# Patient Record
Sex: FEMALE | Race: WHITE | Employment: STUDENT | URBAN - METROPOLITAN AREA
[De-identification: names, ages, dates, MRNs, and addresses within clinical notes are randomized per-mention and may not be internally consistent; named-entity substitution may affect disease eponyms.]

---

## 2018-03-08 ENCOUNTER — HOSPITAL ENCOUNTER (OUTPATIENT)
Dept: PHYSICAL THERAPY | Age: 28
Discharge: HOME | End: 2018-03-08
Payer: COMMERCIAL

## 2018-03-08 PROCEDURE — 97140 MANUAL THERAPY 1/> REGIONS: CPT

## 2018-03-08 PROCEDURE — 97110 THERAPEUTIC EXERCISES: CPT

## 2018-03-08 PROCEDURE — 97162 PT EVAL MOD COMPLEX 30 MIN: CPT

## 2018-03-08 NOTE — PROGRESS NOTES
PHYSICAL THERAPY - DAILY TREATMENT NOTE    Patient Name: Raiza Lees        Date: 3/8/2018  : 1990   yes Patient  Verified  Visit #:     Insurance: Payor: Chencho Busby / Plan: [employer] HMO / Product Type: HMO /      In time: 11:27 Out time: 12:08   Total Treatment Time: 41     Medicare Time Tracking (below)   Total Timed Codes (min):  N/A 1:1 Treatment Time:  N/A     TREATMENT AREA =  Low back pain [M54.5]    SUBJECTIVE  Pain Level (on 0 to 10 scale):  4  / 10   Medication Changes/New allergies or changes in medical history, any new surgeries or procedures?    no  If yes, update Summary List   Subjective Functional Status/Changes:  []  No changes reported     See initial eval          OBJECTIVE      10 min Therapeutic Exercise:  [x]  See flow sheet   Rationale:      increase strength to improve the patients ability to complete tranfers, amb, stair negotiation     12 min Manual Therapy: MET to correct L post rotated innominate in supine. STM to L glute med, piriformis, lumbosacral junction and l/s paraspinals in prone with pillow under hips. Rationale:      decrease pain, decrease trigger points and improve alignment to improve patient's ability to complete transfers, amb, stair negotiation    N/A min Patient Education:  yes  Reviewed HEP   []  Progressed/Changed HEP based on:  Pt instructed on and given HEP to be completed daily. Pt educated on red flags and to seek immediate medical attention/911 if those occur. Pt instructed to avoid heavy lifting, plyometrics/running and Crossfit at this time. Pt educated on proper body mechanics with lifting and transfers. Pt instructed to avoid activities/positions that cause peripheralization of pain/sxs. Reviewed POC and goals. Pt reports understanding.      Other Objective/Functional Measures:    See initial eval     Post Treatment Pain Level (on 0 to 10) scale:    10     ASSESSMENT  Assessment/Changes in Function:     See initial eval     []  See Progress Note/Recertification   Patient will continue to benefit from skilled PT services to see initial eval.   Progress toward goals / Updated goals:    Pt denied sharp pains or red flags with initial eval or therapeutic ex. See initial eval.     PLAN  [x]  Upgrade activities as tolerated yes Continue plan of care   []  Discharge due to :    [x]  Other: PT 2x/week for 4-6 weeks.      Therapist: Herve Smart PT    Date: 3/8/2018 Time: 12:08 PM     Future Appointments  Date Time Provider Magdalena Lombardi   3/12/2018 8:30 AM Miquel Daniels, PTA [facility]   3/14/2018 1:30 PM Miquel Daniels, PTA [facility]   3/16/2018 3:00 PM Anahy Hall, ASH [facility]   3/20/2018 9:00 AM Miquel Daniels, PTA [facility]   3/22/2018 9:00 AM Herve Smart, PT [facility]   3/26/2018 11:30 AM Herve Smart, PT [facility]   3/28/2018 9:30 AM Herve Smart, PT [facility]   4/2/2018 11:30 AM Herve Smart, PT [facility]   4/9/2018 10:30 AM Herve Smart, PT [facility]   4/13/2018 1:30 PM Anahy Hall, PTA [facility]

## 2018-03-08 NOTE — PROGRESS NOTES
[address] [place] [address], [facility] - Phone: [phone]  Fax: [phone] / [facility]  Patient Name: Mariella Vazquez : 1990   Medical   Diagnosis: Radiculopathy, lumbar region Treatment Diagnosis: Low back pain [M54.5], L>R LE pain   Onset Date: 6 weeks prior to PT eval     Referral Source: Pranav Pearson MD Start of Care [facility]): 3/8/2018   Prior Hospitalization: See medical history Provider #: [id number]   Prior Level of Function: Complete prolonged sitting, lifting, transfers, stair negotiation and movement without L/s or L>R LE pain or difficulty   Comorbidities: Depression, Bipolar, Migraines, PMHx of intermittent LBP   Medications: Verified on Patient Summary List   The Plan of Care and following information is based on the information from the initial evaluation.   ===========================================================================================  Assessment / key information:  Pt is a 29 y/o female reporting l/s pain and L>R LE pain rated 3-10/10 that started approximately 6 weeks ago while completing wall balls with 8 lb ball at [place] [address]. Pt reports while going into a squat she felt a pop in her back and immediate l/s and L>R LE pain. Pt reports trying to treat with OTC meds and rest on own, but symptoms did not improve. Pt saw MD 2x since injury and was given meds with temporary improvement in pain/sxs. Pt reports moving a week ago and experiencing increased pain with lifting/carrying/moving objects. Pt reports noticing weakness in L LE. Pain radiates from l/s to toes L>R. Pt intermittently experiences B groin pain. Pt reports intermittent, random numbness/tingling in 3rd through 5th toes and L lat foot.  Pain/sxs increase with movement, sitting, massage, stair negotiation, transfers, rolling over in bed and lifting/carrying objects; decreases with rest, lying supine with LE elevated, side-lying with pillow between knees and meds. Pt reports no imaging performed, but she is scheduled for EMG on 3/27/18. 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 Pain/sxs in past have improved with meds, rest and PT. Pt denies recent falls, bowel/bladder issues, or red flags. Pt demonstrates poor body mechanics with transfers, poor mechanics with mini-squat, decreased pantera with amb, decreased l/s AROM (Standing: ext decreased 90%, flex decreased 75%, B Rot and lat flex decreased 50%; pain with SHANTANU, DKTC, SKTC), decreased L hip PROM (80 degrees flexion, pain with IR/ER), unable to complete scour L hip due to pain, (-) R scour, B Slump and R SLR, (+) B Trendelenberg and L SLR, B LE sensation intact and symmetrical to light touch, L post rotated innominate, TTP L>R glute med/max, piriformis, QL, lumbosacral junction, l/s paraspinals and L proximal HS, decreased B hip/glute strength (hip ABD 3/5 L, 3+/5 R, 3/5 B hip ADD, 3/5 B hip ext, 3+/5 B hip flex strength), 4-/5 L quad, 4/5 R quad, 4-/5 B HS, 4/5 B ankle DF/PF, and decreased functional mobility. Pt reported L LE radicular pain to knee, denied R LE radicular pain during initial eval. Unable to centralize or abolish pain/sxs with repeated movement testing in initial eval. FOTO Score: 44/100  ===========================================================================================  Eval Complexity: History MEDIUM  Complexity : 1-2 comorbidities / personal factors will impact the outcome/ POC ;  Examination  HIGH Complexity : 4+ Standardized tests and measures addressing body structure, function, activity limitation and / or participation in recreation ; Presentation MEDIUM Complexity : Evolving with changing characteristics ;   Decision Making MEDIUM Complexity : FOTO score of 26-74; Overall Complexity MEDIUM  Problem List: pain affecting function, decrease ROM, decrease strength, impaired gait/ balance, decrease ADL/ functional abilitiies, decrease activity tolerance, decrease flexibility/ joint mobility and decrease transfer abilities   Treatment Plan may include any combination of the following: Therapeutic exercise, Therapeutic activities, Neuromuscular re-education, Physical agent/modality, Gait/balance training, Manual therapy, Aquatic therapy, Patient education, Self Care training, Functional mobility training, Home safety training and Stair training  Patient / Family readiness to learn indicated by: asking questions, trying to perform skills and interest  Persons(s) to be included in education: patient (P)  Barriers to Learning/Limitations: None  Measures taken:    Patient Goal (s): \"Less pain, more everyday function, normal strength in leg\"   Patient self reported health status: excellent  Rehabilitation Potential: good   Short Term Goals: To be accomplished in  2  weeks:  1. Pt to demonstrate independence with HEP to improve l/s AROM, glute/core/hip strength/stability for transfers and stair negotiation. 2. Pt to demonstrate correct body mechanics with supine to sit and sit to stand transfers to protect l/s and improves safety and efficiency of transfers at home.  Long Term Goals: To be accomplished in  4-6  weeks:  1. Pt to report 63/100 or > on FOTO scores to improve functional mobility for stair negotiation and lifting/carring objects. 2. Pt to report 50% or > decrease in max pain levels to improve functional mobility for lifting/carrying objects. 3. Pt to demonstrate l/s AROM WFL to improve mobility for ADLs and transfers.   Frequency / Duration:   Patient to be seen  2  times per week for 4-6  weeks:  Patient / Caregiver education and instruction: self care, activity modification and exercises  G-Codes (GP): N/A  Therapist Signature: Leilani Lowery PT Date: 8/8/5398   Certification Period: N/A Time: 12:08 PM ===========================================================================================  I certify that the above Physical Therapy Services are being furnished while the patient is under my care. I agree with the treatment plan and certify that this therapy is necessary. Physician Signature:        Date:       Time:     Please sign and return to [facility]. or you may fax the signed copy to [phone]. Thank you.

## 2018-03-14 ENCOUNTER — HOSPITAL ENCOUNTER (OUTPATIENT)
Dept: PHYSICAL THERAPY | Age: 28
Discharge: HOME | End: 2018-03-14
Payer: COMMERCIAL

## 2018-03-14 PROCEDURE — 97110 THERAPEUTIC EXERCISES: CPT

## 2018-03-14 PROCEDURE — 97140 MANUAL THERAPY 1/> REGIONS: CPT

## 2018-03-14 NOTE — PROGRESS NOTES
PHYSICAL THERAPY - DAILY TREATMENT NOTE    Patient Name: Rea Willson        Date: 3/14/2018  : 1990   YES Patient  Verified  Visit #:     Insurance: Payor: Rick Postal / Plan: [address] [place] [address] [place] HMO / Product Type: HMO /      In time: 125 Out time: 210   Total Treatment Time: 45     Medicare Time Tracking (below)   Total Timed Codes (min):   1:1 Treatment Time:       TREATMENT AREA =  Low back pain [M54.5]    SUBJECTIVE  Pain Level (on 0 to 10 scale):   10   Medication Changes/New allergies or changes in medical history, any new surgeries or procedures? NO    If yes, update Summary List   Subjective Functional Status/Changes:  []  No changes reported     Been doing the exercises. Exercises don't seem to help/          OBJECTIVE    35 min Therapeutic Exercise:  [x]  See flow sheet   Rationale:      increase ROM and increase strength to improve the patients ability to perform functional mobility activities with decrease c/o symptoms     10 min Manual Therapy: DTM L/S paraspinals, L > R, DTM (B) upper glute L>R and L piriformis release. Rationale:      decrease pain, increase ROM and increase tissue extensibility to improve patient's ability to perform functional mobility activities with decrease c/o symptoms       min Patient Education:  YES  Reviewed HEP   []  Progressed/Changed HEP based on: Other Objective/Functional Measures:    No change in functional measurements today. Performed all position with prone including side lying with right bend, no better no worse in symptoms. Patient received HEP for prone lying and prone on elbows. Patient to report back on effectiveness.    Post Treatment Pain Level (on 0 to 10) scale:   3  / 10     ASSESSMENT  Assessment/Changes in Function:     Overall good tolerance to all therapeutic interventions for first treatment session     []  See Progress Note/Recertification   Patient will continue to benefit from skilled PT services to modify and progress therapeutic interventions, address functional mobility deficits, address ROM deficits, address strength deficits, analyze and address soft tissue restrictions and analyze and cue movement patterns to attain remaining goals. Progress toward goals / Updated goals:    No change toward goals today.      PLAN  []  Upgrade activities as tolerated YES Continue plan of care   []  Discharge due to :    []  Other:      Therapist: TEE Mccormick    Date: 3/14/2018 Time: 7:04 AM       Future Appointments  Date Time Provider Magdalena Lombardi   3/14/2018 1:30 PM Sam Bro PTA [facility]   3/16/2018 3:00 PM Amy Michael, ASH [facility]   3/20/2018 9:00 AM Sam Bro PTA [facility]   3/22/2018 9:00 AM Glorious Lennox, PT [facility]   3/26/2018 11:30 AM Glorious Lennox, PT [facility]   3/28/2018 9:30 AM Glorious Lennox, PT [facility]   4/2/2018 11:30 AM Glorious Lennox, PT [facility]   4/9/2018 10:30 AM Glorious Lennox, PT [facility]   4/13/2018 1:30 PM Amy Michael, [facility]

## 2018-03-16 ENCOUNTER — HOSPITAL ENCOUNTER (OUTPATIENT)
Dept: PHYSICAL THERAPY | Age: 28
Discharge: HOME | End: 2018-03-16
Payer: COMMERCIAL

## 2018-03-16 PROCEDURE — 97140 MANUAL THERAPY 1/> REGIONS: CPT

## 2018-03-16 PROCEDURE — 97110 THERAPEUTIC EXERCISES: CPT

## 2018-03-16 NOTE — PROGRESS NOTES
PHYSICAL THERAPY - DAILY TREATMENT NOTE    Patient Name: Buck Marrufo        Date: 3/16/2018  : 1990   YES Patient  Verified  Visit #:   3   of   12  Insurance: Payor: Denia Farrell / Plan: [employer]let HMO / Product Type: HMO /      In time: 255 Out time: 340   Total Treatment Time: 45     Medicare Time Tracking (below)   Total Timed Codes (min):  35 1:1 Treatment Time:       TREATMENT AREA =  Low back pain [M54.5]    SUBJECTIVE  Pain Level (on 0 to 10 scale):  3  / 10   Medication Changes/New allergies or changes in medical history, any new surgeries or procedures? NO    If yes, update Summary List   Subjective Functional Status/Changes:  []  No changes reported     Pt reports no change in pain, still having back pain. Reports no leg pain today. (L) sided low back and upper hip pain. Reports compliance with HEP. OBJECTIVE  Modalities Rationale:     decrease pain and increase tissue extensibility to improve patient's ability to perform pain free ADLs. min [] Estim, type/location:                                      []  att     []  unatt     []  w/US     []  w/ice    []  w/heat    min []  Mechanical Traction: type/lbs                   []  pro   []  sup   []  int   []  cont    []  before manual    []  after manual    min []  Ultrasound, settings/location:      min []  Iontophoresis w/ dexamethasone, location:                                               []  take home patch       []  in clinic   10 min []  Ice     [x]  Heat    location/position: Prone, lumbar/(B) hips    min []  Vasopneumatic Device, press/temp:     min []  Other:    [x] Skin assessment post-treatment (if applicable):    [x]  intact    []  redness- no adverse reaction     []redness - adverse reaction:        25 min Therapeutic Exercise:  [x]  See flow sheet   Rationale:      increase ROM, increase strength, improve coordination and improve balance to improve the patients ability to perform pain free ADLs.       [address] Therapy: STM/DTM (L) glute/piriformis. Rationale:      decrease pain, increase ROM, increase tissue extensibility and decrease trigger points to improve patient's ability to perform pain free ADLs. min Patient Education:  YES  Reviewed HEP   []  Progressed/Changed HEP based on: Other Objective/Functional Measures: Therex per flow sheet. Added multiple exercises to improve core strength and stability for ADLs. See flow sheet. Reduction in radicular symptoms with extension. Progressed to 3x10 PPU. Instructed pt to continue with PPU @ home if radicular pain continued to decrease. Pain located @ upper (L) hip and low back @ end of session. Post Treatment Pain Level (on 0 to 10) scale:   3  / 10     ASSESSMENT  Assessment/Changes in Function:     Good response to extension - reporting decrease in radicular symptosm. []  See Progress Note/Recertification   Patient will continue to benefit from skilled PT services to modify and progress therapeutic interventions, address functional mobility deficits, address ROM deficits, address strength deficits, analyze and address soft tissue restrictions, analyze and cue movement patterns, analyze and modify body mechanics/ergonomics and assess and modify postural abnormalities to attain remaining goals. Progress toward goals / Updated goals:    Progressing toward LTG #3.       PLAN  [x]  Upgrade activities as tolerated YES Continue plan of care   []  Discharge due to :    []  Other:      Therapist: Patricia Cortez PTA    Date: 3/16/2018 Time: 3:08 PM     Future Appointments  Date Time Provider Magdalena Lombardi   3/20/2018 9:00 AM Amanda Elias PTA [facility]   3/22/2018 9:00 AM Addie Mehta, PT [facility]   3/26/2018 11:30 AM Addie Mehta, PT [facility]   3/28/2018 9:30 AM Addie Mehta, PT [facility]   4/2/2018 11:30 AM Addie Mehta, PT [facility]   4/9/2018 10:30 AM Addie Mehta, PT [facility]   4/13/2018 1:30 PM Brett Pavithra Preethi Hippo [facility]

## 2018-03-20 ENCOUNTER — HOSPITAL ENCOUNTER (OUTPATIENT)
Dept: PHYSICAL THERAPY | Age: 28
Discharge: HOME | End: 2018-03-20
Payer: COMMERCIAL

## 2018-03-20 PROCEDURE — 97110 THERAPEUTIC EXERCISES: CPT

## 2018-03-20 PROCEDURE — 97140 MANUAL THERAPY 1/> REGIONS: CPT

## 2018-03-20 NOTE — PROGRESS NOTES
PHYSICAL THERAPY - DAILY TREATMENT NOTE    Patient Name: Emili Crabtree        Date: 3/20/2018  : 1990   YES Patient  Verified  Visit #:     Insurance: Payor: Naomi Saez / Plan: [address] [place] [address] [place] HMO / Product Type: HMO /      In time: 513 Out time: 940   Total Treatment Time: 35     Medicare Time Tracking (below)   Total Timed Codes (min):   1:1 Treatment Time:       TREATMENT AREA =  Low back pain [M54.5]    SUBJECTIVE  Pain Level (on 0 to 10 scale):  3   10   Medication Changes/New allergies or changes in medical history, any new surgeries or procedures? NO    If yes, update Summary List   Subjective Functional Status/Changes:  []  No changes reported     No change in radicular symptoms in (L) LE. Pain referred to knee. No radicular pain in (R) LE.  1-10 in (L) radicular pain/burning sensation. Been doing the HEP along with press ups - no change in symptoms. OBJECTIVE    25 min Therapeutic Exercise:  [x]  See flow sheet   Rationale:      increase ROM and increase strength to improve the patients ability to perform functional mobility activities with decrease c/o symptoms    10 min Manual Therapy: DTM L/S paraspinals, L > R, DTM (B) upper glute L>R and L piriformis release, gentle PA mobs   Rationale:      decrease pain, increase ROM and increase tissue extensibility to improve patient's ability to perform functional mobility activities with decrease c/o symptoms     min Patient Education:  YES  Reviewed HEP   []  Progressed/Changed HEP based on: Other Objective/Functional Measures:    Continues to report some relief of symptoms with prone extension exercise and was instructed to continue with them. Limited carry over at this time. No change in symptoms with PA mobs. = pelvis. Post Treatment Pain Level (on 0 to 10) scale:   3  / 10     ASSESSMENT  Assessment/Changes in Function:     Reports no significant changes in function. Symptoms remain the same.     Patient to see MD on 3/27/18     []  See Progress Note/Recertification   Patient will continue to benefit from skilled PT services to modify and progress therapeutic interventions, address functional mobility deficits, address ROM deficits, address strength deficits, analyze and address soft tissue restrictions and analyze and cue movement patterns to attain remaining goals. Progress toward goals / Updated goals:    No change toward goals noted today. Slow progression with STGs.      PLAN  []  Upgrade activities as tolerated YES Continue plan of care   []  Discharge due to :    []  Other:      Therapist: TEE Mora    Date: 3/20/2018 Time: 6:21 AM       Future Appointments  Date Time Provider Magdalena Lombardi   3/20/2018 9:00 AM Reed Hoover, ASH [facility]   3/22/2018 9:00 AM Shavonne Abdi, PT [facility]   3/26/2018 11:30 AM Shavonne Abdi, PT [facility]   3/28/2018 9:30 AM Shavonne Abdi PT [facility]   4/2/2018 11:30 AM Shavonne Abdi PT [facility]   4/9/2018 10:30 AM Shavonne Abdi, PT [facility]   4/13/2018 1:30 PM Ge Mathis, ASH [facility]

## 2018-03-22 ENCOUNTER — HOSPITAL ENCOUNTER (OUTPATIENT)
Dept: PHYSICAL THERAPY | Age: 28
Discharge: HOME | End: 2018-03-22
Payer: COMMERCIAL

## 2018-03-22 PROCEDURE — 97110 THERAPEUTIC EXERCISES: CPT

## 2018-03-22 PROCEDURE — 97140 MANUAL THERAPY 1/> REGIONS: CPT

## 2018-03-22 NOTE — PROGRESS NOTES
PHYSICAL THERAPY - DAILY TREATMENT NOTE    Patient Name: Jacki Linda        Date: 3/22/2018  : 1990   yes Patient  Verified  Visit #:      of   12  Insurance: Payor: Billy Lunsford / Plan: [address] [place] [address] [employer] / Product Type: HMO /      In time: 8:55 Out time: 9:46   Total Treatment Time: 51     Medicare Time Tracking (below)   Total Timed Codes (min):  N/A 1:1 Treatment Time:  N/A     TREATMENT AREA =  Low back pain [M54.5]    SUBJECTIVE  Pain Level (on 0 to 10 scale):  -7  / 10   Medication Changes/New allergies or changes in medical history, any new surgeries or procedures?    no  If yes, update Summary List   Subjective Functional Status/Changes:  []  No changes reported     Pt reports increased pain the night of last PT session. Pt reports stiffness in l/s and constant radicular pain to knees (B). Pt denies bowel/bladder issues or red flags. Pt reports she will be seeing MD next week. Pt reports compliance with HEP and sometimes repeated l/s ext improves radicular sxs and sometimes no change. 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 OBJECTIVE  Modalities Rationale:     decrease inflammation and decrease pain to improve patient's ability to complete amb, transfers, stair negotiation.    min [] Estim, type/location:                                      []  att     []  unatt     []  w/US     []  w/ice    []  w/heat    min []  Mechanical Traction: type/lbs                   []  pro   []  sup   []  int   []  cont    []  before manual    []  after manual    min []  Ultrasound, settings/location:      min []  Iontophoresis w/ dexamethasone, location:                                               []  take home patch       []  in clinic   10 min [x]  Ice     []  Heat    location/position: Prone to l/s and glutes    min []  Vasopneumatic Device, press/temp:     min []  Other:    [x] Skin assessment post-treatment (if applicable):    [x]  intact   -no adverse reaction     []redness - adverse reaction:        20 min Therapeutic Exercise:  [x]  See flow sheet   Rationale:      increase strength and centralize radicular sxs to improve the patients ability to complete amb, transfers, stair negotiation. 21 min Manual Therapy: MET to correct L ant rotated innominate. STM/DTM to B glute max/med, piriformis and l/s parsapinals in prone. Rationale:      decrease pain, decrease trigger points and improve alignment to improve patient's ability to complete amb, transfers, stair negotiation. N/A min Patient Education:  yes  Reviewed HEP   []  Progressed/Changed HEP based on:  Reviewed importance of body mechanics. Reviewed red flags and to seek immediate medical attention/911 if those occur. Pt reports understanding. Other Objective/Functional Measures:    10 reps SHANTANU (no change in radicular sxs)  10 reps prone press up - 2 sets (no change in radicular sxs)     Post Treatment Pain Level (on 0 to 10) scale:   6-7  / 10     ASSESSMENT  Assessment/Changes in Function:     Pt denied sharp pains or red flags with therapeutic ex. Pt reported feeling decrease in pain after manual therapy, but reported 6-7/10 at end of session. []  See Progress Note/Recertification   Patient will continue to benefit from skilled PT services to modify and progress therapeutic interventions, address functional mobility deficits, address strength deficits, analyze and address soft tissue restrictions and analyze and cue movement patterns to attain remaining goals. Progress toward goals / Updated goals:    No significant progress towards goals. Pt continues to require cues for body mechanics with transfers. Pt unable to centralize/abolish or change radicular sxs with repeated ext or manual therapy during PT session.  Plan to re-assess GROC/FOTO next session and if no change, send pt back to MD. PLAN  [x]  Upgrade activities as tolerated yes Continue plan of care   []  Discharge due to :    []  Other:      Therapist: Ulysses Glenn, PT    Date: 3/22/2018 Time: 9:08 AM     Future Appointments  Date Time Provider Magdalena Lombardi   3/26/2018 11:30 AM Ulysses Glenn, PT [facility]   3/28/2018 9:30 AM Ulysses Glenn, PT [facility]   4/2/2018 11:30 AM Ulysses Glenn, PT [facility]   4/9/2018 10:30 AM Ulysses Glenn, PT [facility]   4/13/2018 1:30 PM Natalie Lopez, ASH [facility]

## 2018-03-26 ENCOUNTER — HOSPITAL ENCOUNTER (OUTPATIENT)
Dept: PHYSICAL THERAPY | Age: 28
Discharge: HOME | End: 2018-03-26
Payer: COMMERCIAL

## 2018-03-26 PROCEDURE — 97140 MANUAL THERAPY 1/> REGIONS: CPT

## 2018-03-26 PROCEDURE — 97110 THERAPEUTIC EXERCISES: CPT

## 2018-03-26 NOTE — PROGRESS NOTES
[facility] PHYSICAL THERAPY  [address] [place] [address] 201,[facility], [address] [facility] - Phone: [phone]  Fax: [phone]  PROGRESS NOTE  Patient Name: Betsy Fernandes : 1990   Treatment/Medical Diagnosis: Low back pain [M54.5], Radiculopathy, lumbar region   Referral Source: Genny Angulo MD     Date of Initial Visit: 3/8/18 Attended Visits: 6 Missed Visits: 1     SUMMARY OF TREATMENT  Physical therapy treatment consists of therapeutic exercises to improve core/glute/LE strength/stability, body mechanics, centralize radicular sxs and improve functional mobility; manual therapy including MET to correct rotated innominates, STM/DTM prn; application of CP prn; and instruction on HEP. CURRENT STATUS  Pt's progress with PT has been slow. Pt's radicular sxs have varied from session to session, with inconsistent centralization with repeated l/s ext in prone. Pt reports 5/10 avg pain level, 8/10 max pain levels, +1 on GROC, 53/100 FOTO scores and compliance with HEP. 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 Pt requires min cues for body mechanics with transfers. Pt demonstrates improvements in l/s AROM, currently: ext, B lat flex and L Rot decreased 50%, R Rot decreased 25%, WFL flex. Pt instructed to follow up with MD due to slow progress with PT. Pt reports she is scheduled for EMG tomorrow, 3/27/18. Goal/Measure of Progress Goal Met? 1.  Pt to report 63/100 or > on FOTO scores to improve functional mobility for stair negotiation and lifting/carrying objects. Status at last Eval: 44/100 Current Status: 53/100 Progressing   2. Pt to report 50% or > decrease in max pain levels to improve functional mobility for lifting/carrying objects. Status at last Eval: 10/10 Current Status: 8/10 Progressing   3.   Pt to demonstrate l/s AROM WFL to improve mobility for ADLs and transfers. Status at last Eval: Ext decreased 90%, flex decreased 75%, B Rot and lat flex decreased 50% Current Status: See above Progressing     New Goals to be achieved in __4__  weeks:  1. Pt to report 63/100 or > on FOTO scores to improve functional mobility for stair negotiation and lifting/carrying objects. 2. Pt to report 50% or > decrease in max pain levels to improve functional mobility for lifting/carrying objects. 3. Pt to demonstrate l/s AROM WFL to improve mobility for ADLs and transfers. RECOMMENDATIONS  Recommend patient continue with PT 2x/week for 4 more weeks to further improve upon pain/sxs, l/s mobility, glute/core strength/stability and functional mobility. Please advise. Thank you. If you have any questions/comments please contact us directly at [phone]. Thank you for allowing us to assist in the care of your patient. Therapist Signature: Connie Kelly PT Date: 3/26/2018     Time: 11:44 AM   NOTE TO PHYSICIAN:  PLEASE COMPLETE THE ORDERS BELOW AND FAX TO   [facility]: [phone]  If you are unable to process this request in 24 hours please contact our office: [phone]    ___ I have read the above report and request that my patient continue as recommended.   ___ I have read the above report and request that my patient continue therapy with the following changes/special instructions:_________________________________________________________   ___ I have read the above report and request that my patient be discharged from therapy.      Physician Signature:        Date:       Time:

## 2018-03-26 NOTE — PROGRESS NOTES
PHYSICAL THERAPY - DAILY TREATMENT NOTE    Patient Name: Mynor Brown        Date: 3/26/2018  : 1990   yes Patient  Verified  Visit #:   6      12  Insurance: Payor: Monica Chambers / Plan: [address] [place] [address] [employer] / Product Type: HMO /      In time: 11:27 Out time: 12:07   Total Treatment Time: 40     Medicare Time Tracking (below)   Total Timed Codes (min):  N/A 1:1 Treatment Time:  N/A     TREATMENT AREA =  Low back pain [M54.5]    SUBJECTIVE  Pain Level (on 0 to 10 scale):  2  / 10   Medication Changes/New allergies or changes in medical history, any new surgeries or procedures? 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 Avg pain level over past week: 5/10, Max pain level: 8/10 (increases randomly, once it hurts - everything makes it hurt), GROC: +1, FOTO Score: 53/100. Pt reports compliance with HEP and is trying to be aware of body mechanics. Pt reports feeling an improvement in pain yesterday without specific region. Pt denies LE radicular pain/sxs today, only back pain. Pt denies falls or red flags.      OBJECTIVE  Modalities Rationale:     N/A to improve patient's ability to complete N/A - pt declined   min [] Estim, type/location:                                      []  att     []  unatt     []  w/US     []  w/ice    []  w/heat    min []  Mechanical Traction: type/lbs                   []  pro   []  sup   []  int   []  cont    []  before manual    []  after manual    min []  Ultrasound, settings/location:      min []  Iontophoresis w/ dexamethasone, location:                                               []  take home patch       []  in clinic    min []  Ice     []  Heat    location/position:     min []  Vasopneumatic Device, press/temp:     min []  Other:    [] Skin assessment post-treatment (if applicable):    []  intact   -no adverse reaction     []redness - adverse reaction:        25 min Therapeutic Exercise:  [x]  See flow sheet   Rationale:      increase strength and centralize radicular sxs to improve the patients ability to complete amb, transfers, stair negotiation. 15 min Manual Therapy: Even innominates. STM/DTM to B glute max/med, piriformis in prone. Rationale:      decrease pain and decrease trigger points to improve patient's ability to complete amb, transfers, stair negotiation. N/A min Patient Education:  yes  Reviewed HEP   []  Progressed/Changed HEP based on:  Reviewed importance of body mechanics. Reviewed red flags and to seek immediate medical attention/911 if those occur. Pt reports understanding. Other Objective/Functional Measures:    Pt reported during session that she has a latex sensitivity which sometimes causes hives. Pt reports she said she didn't have an allergy to latex on paperwork and when asked multiple times earlier by PT because she doesn't consider it an \"allergy\". Pt provided pt with latex free bands and from now on will only use latex free bands/tubing. L/s AROM: ext, B lat flex and L Rot decreased 50%, R Rot decreased 25%, WFL flex    Min cues for supine to sit transfers    After manual was complete PT and pt spoke about importance of proper body mechanics with transfers to protect spine. Pt reported that she felt pretty good and that she had seen the chiropractor earlier this morning for an adjustment. Pt reports she still sees chiropractor every 2 weeks. Post Treatment Pain Level (on 0 to 10) scale:   2-3 / 10     ASSESSMENT  Assessment/Changes in Function:     Pt denied sharp pains or red flags with therapeutic ex. See PN.      [x]  See Progress Note/Recertification   Patient will continue to benefit from skilled PT services to modify and progress therapeutic interventions, address functional mobility deficits, address strength deficits, analyze and address soft tissue restrictions and analyze and cue movement patterns to attain remaining goals. Progress toward goals / Updated goals:    See PN.      PLAN  [x]  Upgrade activities as tolerated yes Continue plan of care   []  Discharge due to :    [x]  Other: Send PN to MD.      Therapist: Fifi Barragan PT    Date: 3/26/2018 Time: 11:30 AM     Future Appointments  Date Time Provider Magdalena Lombardi   3/28/2018 9:30 AM Fifi Barragan, PT [facility]   4/2/2018 11:30 AM Fifi Barragan, PT [facility]   4/9/2018 10:30 AM Fifi Barragan, PT [facility]   4/13/2018 1:30 PM Autumn Solomon, ASH [facility]

## 2018-03-28 ENCOUNTER — HOSPITAL ENCOUNTER (OUTPATIENT)
Dept: PHYSICAL THERAPY | Age: 28
Discharge: HOME | End: 2018-03-28
Payer: COMMERCIAL

## 2018-03-28 PROCEDURE — 97110 THERAPEUTIC EXERCISES: CPT

## 2018-03-28 PROCEDURE — 97140 MANUAL THERAPY 1/> REGIONS: CPT

## 2018-03-28 NOTE — PROGRESS NOTES
PHYSICAL THERAPY - DAILY TREATMENT NOTE    Patient Name: Kori Nova        Date: 3/28/2018  : 1990   yes Patient  Verified  Visit #:     Insurance: Payor: Ginette Lin / Plan: [employer] HMO / Product Type: HMO /      In time: 9:27 Out time: 10:07   Total Treatment Time: 40     Medicare Time Tracking (below)   Total Timed Codes (min):  N/A 1:1 Treatment Time:  N/A     TREATMENT AREA =  Low back pain [M54.5]    SUBJECTIVE  Pain Level (on 0 to 10 scale):  3- 10   Medication Changes/New allergies or changes in medical history, any new surgeries or procedures?    no  If yes, update Summary List   Subjective Functional Status/Changes:  []  No changes reported   Pt reports seeing MD and was instructed to continue with PT. Pt reports EMG was normal. Pt reports she will be scheduling MRI. Pt denies falls or red flags. Pt reports compliance with HEP. OBJECTIVE  Modalities Rationale:     N/A to improve patient's ability to complete N/A - pt declined   min [] Estim, type/location:                                      []  att     []  unatt     []  w/US     []  w/ice    []  w/heat    min []  Mechanical Traction: type/lbs                   []  pro   []  sup   []  int   []  cont    []  before manual    []  after manual    min []  Ultrasound, settings/location:      min []  Iontophoresis w/ dexamethasone, location:                                               []  take home patch       []  in clinic    min []  Ice     []  Heat    location/position:     min []  Vasopneumatic Device, press/temp:     min []  Other:    [] Skin assessment post-treatment (if applicable):    []  intact   -no adverse reaction     []redness - adverse reaction:        25 min Therapeutic Exercise:  [x]  See flow sheet   Rationale:      increase strength and centralize radicular sxs to improve the patients ability to complete amb, transfers, stair negotiation. 15 min Manual Therapy: Even innominates.  STM/DTM to B glute max/med, piriformis in prone and L>R hip flex in supine. Rationale:      decrease pain and decrease trigger points to improve patient's ability to complete amb, transfers, stair negotiation. N/A min Patient Education:  yes  Reviewed HEP   []  Progressed/Changed HEP based on:  Reviewed importance of body mechanics. Reviewed red flags and to seek immediate medical attention/911 if those occur. Pt reports understanding. Other Objective/Functional Measures:    TTP L>R hip flexors today   Post Treatment Pain Level (on 0 to 10) scale:   2 / 10     ASSESSMENT  Assessment/Changes in Function:     Pt denied sharp pains or red flags with therapeutic ex. Pt reported an improvement in pain/sxs at end of session and denied radicular sxs. []  See Progress Note/Recertification   Patient will continue to benefit from skilled PT services to modify and progress therapeutic interventions, address functional mobility deficits, address strength deficits, analyze and address soft tissue restrictions and analyze and cue movement patterns to attain remaining goals.    Progress toward goals / Updated goals:    Pain levels improved with PT Rx this session - slow progress towards new goal #2     PLAN  [x]  Upgrade activities as tolerated yes Continue plan of care   []  Discharge due to :    []  Other:      Therapist: Thelma Cardenas PT    Date: 3/28/2018 Time: 10:07 AM     Future Appointments  Date Time Provider Magdalena Lombardi   4/2/2018 11:30 AM Thelma Cardenas PT [facility]   4/9/2018 10:30 AM Thelma Cardenas, PT [facility]   4/13/2018 1:30 PM Juana Cain PTA [facility]

## 2018-04-02 ENCOUNTER — HOSPITAL ENCOUNTER (OUTPATIENT)
Dept: PHYSICAL THERAPY | Age: 28
Discharge: HOME | End: 2018-04-02
Payer: COMMERCIAL

## 2018-04-02 PROCEDURE — 97140 MANUAL THERAPY 1/> REGIONS: CPT

## 2018-04-02 PROCEDURE — 97110 THERAPEUTIC EXERCISES: CPT

## 2018-04-02 NOTE — PROGRESS NOTES
PHYSICAL THERAPY - DAILY TREATMENT NOTE    Patient Name: Brian Castro        Date: 2018  : 1990   yes Patient  Verified  Visit #:     Insurance: Payor: Kody Walsh / Plan: [employer] HMO / Product Type: HMO /      In time: 11:30 Out time: 12:24   Total Treatment Time: 54     Medicare Time Tracking (below)   Total Timed Codes (min):  N/A 1:1 Treatment Time:  N/A     TREATMENT AREA =  Low back pain [M54.5]    SUBJECTIVE  Pain Level (on 0 to 10 scale):  3 / 10   Medication Changes/New allergies or changes in medical history, any new surgeries or procedures?    no  If yes, update Summary List   Subjective Functional Status/Changes:  []  No changes reported   Pt reports she has been feeling pretty good and was able to mow the lawn (push mower) yesterday. Pt reports resting afterwards due to increased soreness/pain. Pt denies falls or red flags. Pt reports compliance with HEP. Pt reports she wants to get back to spin class and in the gym. OBJECTIVE  Modalities Rationale:     decrease inflammation and decrease pain to improve patient's ability to complete amb, transfers, stair negotiation.    min [] Estim, type/location:                                      []  att     []  unatt     []  w/US     []  w/ice    []  w/heat    min []  Mechanical Traction: type/lbs                   []  pro   []  sup   []  int   []  cont    []  before manual    []  after manual    min []  Ultrasound, settings/location:      min []  Iontophoresis w/ dexamethasone, location:                                               []  take home patch       []  in clinic   10 min [x]  Ice     []  Heat    location/position: Prone to l/s and glutes    min []  Vasopneumatic Device, press/temp:     min []  Other:    [x] Skin assessment post-treatment (if applicable):    [x]  intact   -no adverse reaction     []redness - adverse reaction:        34 min Therapeutic Exercise:  [x]  See flow sheet   Rationale:      increase strength and centralize radicular sxs to improve the patients ability to complete amb, transfers, stair negotiation. 10 min Manual Therapy: Even innominates. STM/DTM to L>R glute max/med, piriformis in prone   Rationale:      decrease pain and decrease trigger points to improve patient's ability to complete amb, transfers, stair negotiation. N/A min Patient Education:  yes  Reviewed HEP   []  Progressed/Changed HEP based on:  Reviewed importance of body mechanics. Pt instructed NOT to complete running/jumping/plyometrics/spin class at this time. Pt instructed to ice prn 10-15 minutes on / 1 hour off. Pt reports understanding. Other Objective/Functional Measures: Added: Minibands, pall of press, SLS, and mini-squats with band to improve glute/core strength/stability    Increased reps to improve glute strength   Post Treatment Pain Level (on 0 to 10) scale:   3 / 10     ASSESSMENT  Assessment/Changes in Function:     Pt denied sharp pains or red flags with therapeutic ex. []  See Progress Note/Recertification   Patient will continue to benefit from skilled PT services to modify and progress therapeutic interventions, address functional mobility deficits, address strength deficits, analyze and address soft tissue restrictions and analyze and cue movement patterns to attain remaining goals. Progress toward goals / Updated goals:    Able to progress therapeutic ex this session without an increase in pain levels. Pt able to mow the lawn over the weekend - progressing towards new goal #1.      PLAN  [x]  Upgrade activities as tolerated yes Continue plan of care   []  Discharge due to :    []  Other:      Therapist: Mike Plascencia PT    Date: 4/2/2018 Time: 11:56 AM     Future Appointments  Date Time Provider Magdalena Lombardi   4/9/2018 10:30 AM Mike Plascencia PT [facility]   4/13/2018 6:30 AM Jonas Zafar, PTA [facility]

## 2018-04-09 ENCOUNTER — HOSPITAL ENCOUNTER (OUTPATIENT)
Dept: PHYSICAL THERAPY | Age: 28
Discharge: HOME | End: 2018-04-09
Payer: COMMERCIAL

## 2018-04-09 PROCEDURE — 97140 MANUAL THERAPY 1/> REGIONS: CPT

## 2018-04-09 PROCEDURE — 97110 THERAPEUTIC EXERCISES: CPT

## 2018-04-09 NOTE — PROGRESS NOTES
PHYSICAL THERAPY - DAILY TREATMENT NOTE    Patient Name: Bere Eaton        Date: 2018  : 1990   yes Patient  Verified  Visit #:     Insurance: Payor: Ochoa Garces / Plan: [employer] HMO / Product Type: HMO /      In time: 10:23 Out time: 11:20   Total Treatment Time: 57     Medicare Time Tracking (below)   Total Timed Codes (min):  N/A 1:1 Treatment Time:  N/A     TREATMENT AREA =  Low back pain [M54.5]    SUBJECTIVE  Pain Level (on 0 to 10 scale):  2-3 / 10   Medication Changes/New allergies or changes in medical history, any new surgeries or procedures?    no  If yes, update Summary List   Subjective Functional Status/Changes:  []  No changes reported   Pt denies increase in pain levels after last PT session. Pt reports having a bad day over the weekend after sitting on folding chair for prolonged period of time. Pt denies falls or red flags. Pt reports compliance with HEP. OBJECTIVE  Modalities Rationale:     decrease inflammation and decrease pain to improve patient's ability to complete amb, transfers, stair negotiation.    min [] Estim, type/location:                                      []  att     []  unatt     []  w/US     []  w/ice    []  w/heat    min []  Mechanical Traction: type/lbs                   []  pro   []  sup   []  int   []  cont    []  before manual    []  after manual    min []  Ultrasound, settings/location:      min []  Iontophoresis w/ dexamethasone, location:                                               []  take home patch       []  in clinic   10 min [x]  Ice     []  Heat    location/position: Prone to l/s and glutes    min []  Vasopneumatic Device, press/temp:     min []  Other:    [x] Skin assessment post-treatment (if applicable):    [x]  intact   -no adverse reaction     []redness - adverse reaction:        37 min Therapeutic Exercise:  [x]  See flow sheet   Rationale:      increase strength and increase proprioception to improve the patients ability to complete amb, transfers, stair negotiation. 10 min Manual Therapy: Even innominates. STM/DTM to R l/s paraspinals, and B glute med, piriformis in prone   Rationale:      decrease pain and decrease trigger points to improve patient's ability to complete amb, transfers, stair negotiation. N/A min Patient Education:  yes  Reviewed HEP   []  Progressed/Changed HEP based on:  Reviewed importance of body mechanics. Pt instructed NOT to complete running/jumping/plyometrics/spin class at this time. Pt instructed to ice prn 10-15 minutes on / 1 hour off. Pt reports understanding. Other Objective/Functional Measures:  *Increased resistance to improve glute/LE/core strength/stability    *Added: Quadruped LE raise, Quadruped multifidus, forward plank to improve core strength/stability   Post Treatment Pain Level (on 0 to 10) scale:   1 / 10     ASSESSMENT  Assessment/Changes in Function:     Pt denied sharp pains or red flags with therapeutic ex. Pt reported an improvement in pain/sxs at end of session. []  See Progress Note/Recertification   Patient will continue to benefit from skilled PT services to modify and progress therapeutic interventions, address functional mobility deficits, address strength deficits, analyze and address soft tissue restrictions and analyze and cue movement patterns to attain remaining goals. Progress toward goals / Updated goals:    Progressed therapeutic ex today. Recent max pain level; 6/10 - progressing towards new goal #2.      PLAN  [x]  Upgrade activities as tolerated yes Continue plan of care   []  Discharge due to :    []  Other:      Therapist: Rush Samuels PT    Date: 4/9/2018 Time: 10:27 AM     Future Appointments  Date Time Provider Magdalena Lombardi   4/9/2018 10:30 AM Rush Samuels, PT [facility]   4/13/2018 6:30 AM Reuben Connor PTA [facility]

## 2018-04-13 ENCOUNTER — HOSPITAL ENCOUNTER (OUTPATIENT)
Dept: PHYSICAL THERAPY | Age: 28
Discharge: HOME | End: 2018-04-13
Payer: COMMERCIAL

## 2018-04-13 PROCEDURE — 97110 THERAPEUTIC EXERCISES: CPT

## 2018-04-13 PROCEDURE — 97140 MANUAL THERAPY 1/> REGIONS: CPT

## 2018-04-13 NOTE — PROGRESS NOTES
PHYSICAL THERAPY - DAILY TREATMENT NOTE    Patient Name: Julio Cortez        Date: 2018  : 1990   YES Patient  Verified  Visit #:   10   of   20  Insurance: Payor: Carla Parekh / Plan: [address] HMO / Product Type: HMO /      In time: 821 Out time: 948   Total Treatment Time: 50     Medicare Time Tracking (below)   Total Timed Codes (min):   1:1 Treatment Time:       TREATMENT AREA =  Low back pain [M54.5]    SUBJECTIVE  Pain Level (on 0 to 10 scale):  4  / 10   Medication Changes/New allergies or changes in medical history, any new surgeries or procedures? NO    If yes, update Summary List   Subjective Functional Status/Changes:  []  No changes reported     A little bit of leg pain. Pain a little higher today, busy day yesterday. OBJECTIVE  Modalities Rationale:     decrease inflammation and decrease pain to improve patient's ability to complete amb, transfers, stair negotiation. min [] Estim, type/location:                                      []  att     []  unatt     []  w/US     []  w/ice    []  w/heat    min []  Mechanical Traction: type/lbs                   []  pro   []  sup   []  int   []  cont    []  before manual    []  after manual    min []  Ultrasound, settings/location:      min []  Iontophoresis w/ dexamethasone, location:                                               []  take home patch       []  in clinic   10 min [x]  Ice     []  Heat    location/position: Prone to l/s and glutes    min []  Vasopneumatic Device, press/temp:     min []  Other:    [x] Skin assessment post-treatment (if applicable):    [x]  intact    [x]  redness- no adverse reaction     []redness - adverse reaction:        30 min Therapeutic Exercise:  [x]  See flow sheet   Rationale:      increase ROM and increase strength to improve the patients ability to complete amb, transfers, stair negotiation. 10 min Manual Therapy: Even innominates.  STM/DTM to B l/s paraspinals, and B glute med, piriformis( L>R) in prone   Rationale:      decrease pain, increase ROM and increase tissue extensibility to improve patient's ability to complete amb, transfers, stair negotiation. min Patient Education:  YES  Reviewed HEP   []  Progressed/Changed HEP based on: Other Objective/Functional Measures:    Increase (L) hip and (L) L/S paraspinals tightness with reports of increase pressure. Radicular pain to (L) glute region only. Post Treatment Pain Level (on 0 to 10) scale:   4  / 10     ASSESSMENT  Assessment/Changes in Function:     Reports increase pressure of L/S and slight return of radicular symptoms in (L) LE. Attempted PPU to decrease (L) glute radicular symptoms, no change. No change in symptoms at end of session. []  See Progress Note/Recertification   Patient will continue to benefit from skilled PT services to modify and progress therapeutic interventions, address functional mobility deficits, address ROM deficits, address strength deficits, analyze and address soft tissue restrictions and analyze and cue movement patterns to attain remaining goals. Progress toward goals / Updated goals:    Progressing steadily toward goals.      PLAN  []  Upgrade activities as tolerated YES Continue plan of care   []  Discharge due to :    []  Other:      Therapist: TEE Navarro    Date: 4/13/2018 Time: 6:08 AM       Future Appointments  Date Time Provider Magdalena Lombardi   4/13/2018 6:30 AM ASH Garnica [facility]

## 2018-07-03 NOTE — PROGRESS NOTES
[address], [address] [facility] - Phone: [phone]  Fax: [phone] SUMMARY  Patient Name: Sorin Arthur : 1990   Treatment/Medical Diagnosis: Low back pain [M54.5], Radiculopathy, Lumbar Region   Referral Source: Diamond Ghotra MD     Date of Initial Visit: 3/8/18 Attended Visits: 10 Missed Visits: 1     SUMMARY OF TREATMENT  Physical therapy treatment consists of therapeutic exercises to improve core/glute/LE strength/stability, body mechanics, centralize radicular sxs and improve functional mobility; manual therapy including MET to correct rotated innominates, STM/DTM prn; application of CP prn; and instruction on HEP. CURRENT STATUS  Patient was demonstrating slow progress with PT. Pt reported recent max pain level at 6/10. Pt did not return to PT after visit on 18. No formal re-assessment performed due to pt self discharge. Goal/Measure of Progress Goal Met? 1.  Pt to report 63/100 or > on FOTO scores to improve functional mobility for stair negotiation and lifting/carrying objects. Status at last Eval: 53/100 Current Status: Not re-assessed due to pt self discharge no   2. Pt to report 50% or > decrease in max pain levels to improve functional mobility for lifting/carrying objects. Status at last Eval: 10/10 - initial eval  8/10 - last assessment Current Status: 6/10 Progressing   3. Pt to demonstrate l/s AROM WFL to improve mobility for ADLs and transfers. Status at last Eval: Ext, B lat flex and L Rot decreased 50%, R Rot decreased 25%, WFL flex Current Status: Not re-assessed due to pt self discharge no     RECOMMENDATIONS  Discontinue therapy due to lack of attendance or compliance. Thank you for this referral.    If you have any questions/comments please contact us directly at [phone].    Thank you for allowing us to assist in the care of your patient.     Therapist Signature: Pat Santos, PT Date: 7/3/18     Time: 3:57 PM

## 2019-07-24 ENCOUNTER — APPOINTMENT (OUTPATIENT)
Dept: NUTRITION | Age: 29
End: 2019-07-24

## 2019-07-30 ENCOUNTER — HOSPITAL ENCOUNTER (OUTPATIENT)
Dept: NUTRITION | Age: 29
Discharge: HOME | End: 2019-07-30
Payer: COMMERCIAL

## 2019-07-30 PROCEDURE — 97802 MEDICAL NUTRITION INDIV IN: CPT

## 2019-07-30 NOTE — PROGRESS NOTES
[facility] [place] - Outpatient Nutrition Services  [address] [address] Juany Chung  [phone]   Nutrition Assessment  Medical Nutrition Therapy   Outpatient Initial Evaluation         Patient Name: Nahid Acosta : 1990   Treatment Diagnosis: Wt gain   Referral Source: Masha Mariano [facility]): 2019     Gender: female Age: 33 y.o. Ht: 65 in Wt:  251 lb  kg   BMI: 41.8 BMR   Male  BMR Female 1865   Anthropometrics Assessment: BMI indicates obese III     Past Medical History includes: Wt gain, low D, (father high BP and DM; mother RA). Pertinent Medications:   H/o steroid use Oct 2018 - 2019, D3, Lamictal, Lexapro, Amitriptyline, Trazodone, Prilosec   Biochemical Data:   N/A     Subjective/Assessment:   Pt in today with wt gain r/t steroid use. Previously, pt was on Seroquel and gained wt at that time too, however, she subsequently lost 40# after med was d/c'ed. Since  of this year, she has gained 50# and cannot lose any weight. She has tried keto again from May 2019 - 2019 and only saw a 2# wt loss during that time (she was previously successful when following keto diet). Pt is in [place] school until May 2021. She lives with her BF and pt regularly cooks (6 nights/week). Pt had her thyroid function tested and thyroid WNL. D level decreased. Previously, pt was exercising 4 days/week ([employer]) and swam 3-4 times/week for 1 hour which she continues to do. She cannot lift currently d/t degenerative discs. UBW: 160-170. Procedure planned for first 2 weeks of September for nerve ablation to alleviate pain. Current Eating Patterns: Pt drinks water t/o the day - yesterday she had over 100 oz., which is normal for her. She also drinks [place] sparkling water (unsweetened) and 1 diet soda with dinner. She drinks 1 cup coffee in am with almond milk and Splenda. At times she will have unsweetened tea.  Pt doesn't typically have dessert and often she doesn't eat out. B: bowl of raisin bran with almond milk or coconut milk  L: sandwich or wrap or leftovers (mashed sweet potatoes with chicken sausage and asparagus)  Sn: apple with peanut butter    D: cooks at home - burger no bun, air fried fish with veggies and sweet potatoes     Estimate Needs   Calories:  1550 Protein: 116 Carbs: 155 Fat: 52   Kcal/day  g/day  g/day  g/day        percent: 30  40  30               Education & Recommendations provided: Discussed the Healthy Plate Method and appropriate portion sizes of different food groups. Explained the importance of combining carbohydrates with protein at meals and reviewed foods that contain each nutrient. Emphasized the importance of consistent meal time intervals throughout the day to improve metabolism. Explained calorie density and empty calories to assist pt with understanding portion sizes and limiting excess calories. Educated pt on all carbohydrates found in foods and encouraged no more than 45 g/meal and 15-20 g/snack. Encouraged patient to food journal at least 4 times per week to capture eating patterns and hidden calories/carbohydrates.    Handouts Provided: [x]  Carbohydrates  [x]  Protein  [x]  Fiber  []  Serving Sizes  []  Meal and Snack Ideas  []  Food Journals []  Diabetes  []  Cholesterol  []  Sodium  []  Gen Nutr Guidelines  []  SBGM Guidelines  [x]  Others: Mediterranean Diet 101, snacks, non-starchy veggies, right size portion plate   Information Reviewed with: Pt   Readiness to Change Stage: []  Pre-contemplative    []  Contemplative  []  Preparation               [x]  Action                  []  Maintenance   Potential Barriers to Learning: []  Decline in memory    []  Language barrier   []  Other:  []  Emotional                  []  Limited mobility  []  Lack of motivation     [] Vision, hearing or cognitive impairment   Expected Compliance: Good     Nutritional Goal - To promote lifestyle changes to result in:    [x]  Weight loss  [] Improved diabetic control  []  Decreased cholesterol levels  []  Decreased blood pressure  []  Weight maintenance []  Preventing any interactions associated with food allergies  []  Adequate weight gain toward goal weight  []  Other:        Patient Goals:  SMART goals 1. Limit CHO's to 45 g/meal and 15-20 g/snack  2. Have at least 1 serving of protein each time you eat (balanced meals)  3. Aim to have 3 meals and 1 snack/day  4.  Track food into MFP at least 3 days/week     Dietitian Signature: Navi Maier MS, [place] Date: 7/30/2019   Follow-up: 9/20/19 @ 8 Time: 9:45 AM

## 2019-09-20 ENCOUNTER — HOSPITAL ENCOUNTER (OUTPATIENT)
Dept: NUTRITION | Age: 29
Discharge: HOME | End: 2019-09-20
Payer: COMMERCIAL

## 2019-09-20 PROCEDURE — 97803 MED NUTRITION INDIV SUBSEQ: CPT

## 2019-09-20 NOTE — PROGRESS NOTES
NUTRITION  FOLLOW-UP TREATMENT NOTE  Patient Name: Keaton Becerra         Date: 2019  : 1990    YES/NO Patient  Verified  Diagnosis: Wt gain   In time:   8             Out time:   8:30   Total Treatment Time (min):   30     SUBJECTIVE/ASSESSMENT    Changes in medication or medical history? Any new allergies, surgeries or procedures? NO    If yes, update Summary List   Pt in today for follow up. She feels she has been able to consistently reach her goals, but remains frustrated with slow weight loss. B: yogurt with fruit and granola or oatmeal with coffee and apple with peanut butter. L: leftovers or tuna salad on celery with bell peppers and hummus. D: fish and brown rice with veggies. She has been using Pinterest for recipes at home. Pt had an episode of pitting edema which is concerning, as she has not experienced this before. She plans to see the doctor. Current Wt: 252 Previous Wt: 251 Wt Change: +1     Achievement of Goals: 1. Limit CHO's to 45 g/meal and 15-20 g/snack = met, continue  2. Have at least 1 serving of protein each time you eat (balanced meals) = met, continue  3. Aim to have 3 meals and 1 snack/day = in progress, continue  4.  Track food into MFP at least 3 days/week = met, continue     Patient Education:  [x]  Review current plan with patient   []  Other:    Handouts/  Information Provided: []  Carbohydrates  []  Protein  []  Fiber  []  Serving Sizes  []  Fluids  []  General guidelines []  Diabetes  []  Cholesterol  []  Sodium  []  SBGM  []  Food Journals  [x]  Others: recipes (healthy chicken and shrimp fried rice, easy chicken recipes)     New Patient Goals: No new goals - continue with previous goals (CHO's, protein, meal spacing)     PLAN    [x]  Continue on current plan []  Follow-up PRN   []  Discharge due to :    [x]  Next appt: 11/15/19 @ 8     Dietitian: Ana Smith MS, RD    Date: 2019 Time: 8:00 AM

## 2019-11-15 ENCOUNTER — HOSPITAL ENCOUNTER (OUTPATIENT)
Dept: NUTRITION | Age: 29
Discharge: HOME | End: 2019-11-15
Payer: COMMERCIAL

## 2019-11-15 PROCEDURE — 97803 MED NUTRITION INDIV SUBSEQ: CPT

## 2019-11-15 NOTE — PROGRESS NOTES
NUTRITION  FOLLOW-UP TREATMENT NOTE  Patient Name: Katelin Lino         Date: 11/15/2019  : 1990    YES/NO Patient  Verified  Diagnosis: Wt gain   In time:   8             Out time:   8:30   Total Treatment Time (min):   30     SUBJECTIVE/ASSESSMENT    Changes in medication or medical history? Any new allergies, surgeries or procedures? YES/NO    If yes, update Summary List   Pt in today for follow up. She has had 2 procedures since her last visit on her back, the last one being about a month ago. She has been trying to swim 3-5 times/week to relieve pain and continue with ROM. School has also been difficult. She states that until 1-2 weeks ago, she was eating unhealthy for 1 month. Now she is back on track. B: apple with peanut butter. L/D: same as before. Pt also visited the endo and he wants to runs tests in January since that will be one year since she stopped steroids. Current Wt: 260 Previous Wt: 252 Wt Change: +8     Achievement of Goals: No new goals - continue with previous goals (CHO's, protein, meal spacing)     Patient Education:  [x]  Review current plan with patient   []  Other:    Handouts/  Information Provided: []  Carbohydrates  []  Protein  []  Fiber  []  Serving Sizes  []  Fluids  []  General guidelines []  Diabetes  []  Cholesterol  []  Sodium  []  SBGM  []  Food Journals  []  Others:      New Patient Goals: 1.  Continue previous goals     PLAN    [x]  Continue on current plan []  Follow-up PRN   []  Discharge due to :    [x]  Next appt: Pt will call     Dietitian: Ana Daniel MS, RD    Date: 11/15/2019 Time: 8:09 AM

## 2022-12-01 RX ORDER — TOPIRAMATE 25 MG/1
TABLET ORAL DAILY
COMMUNITY

## 2022-12-01 RX ORDER — CETIRIZINE HYDROCHLORIDE 10 MG/1
CAPSULE, LIQUID FILLED ORAL DAILY
COMMUNITY

## 2022-12-01 RX ORDER — FAMOTIDINE 40 MG/1
40 TABLET, FILM COATED ORAL DAILY
COMMUNITY

## 2022-12-01 RX ORDER — ESCITALOPRAM OXALATE 20 MG/1
20 TABLET ORAL DAILY
COMMUNITY

## 2022-12-01 RX ORDER — MONTELUKAST SODIUM 10 MG/1
10 TABLET ORAL DAILY
COMMUNITY

## 2022-12-01 RX ORDER — LAMOTRIGINE 150 MG/1
TABLET ORAL DAILY
COMMUNITY

## 2022-12-01 NOTE — PERIOP NOTES
PRE-SURGICAL INSTRUCTIONS        Patient's Name:  Abraham Berry      OICARMELINAQYOKO Date:  12/1/2022            Covid Testing Date and Time:    Surgery Date:  12/8/2022                Do NOT eat or drink anything, including candy, gum, or ice chips after midnight on 12/8, unless you have specific instructions from your surgeon or anesthesia provider to do so. You may brush your teeth before coming to the hospital.  No smoking 24 hours prior to the day of surgery. No alcohol 24 hours prior to the day of surgery. No recreational drugs for one week prior to the day of surgery. Leave all valuables, including money/purse, at home. Remove all jewelry, nail polish, acrylic nails, and makeup (including mascara); no lotions powders, deodorant, or perfume/cologne/after shave on the skin. Follow instruction for Hibiclens washes and CHG wipes from surgeon's office. Glasses/contact lenses and dentures may be worn to the hospital.  They will be removed prior to surgery. Call your doctor if symptoms of a cold or illness develop within 24-48 hours prior to your surgery. 11.  If you are having an outpatient procedure, please make arrangements for a responsible ADULT TO [address] [place] [address] and stay with you for 24 hours after your surgery. 12. ONE VISITOR in the hospital at this time for outpatient procedures. Exceptions may be made for surgical admissions, per nursing unit guidelines      Special Instructions:      Bring list of CURRENT medications. Bring any pertinent legal medical records. Take these medications the morning of surgery with a sip of water:  per MD    Complete bowel prep per MD instructions. On the day of surgery, come in the main entrance of [facility]. Let the  at the desk know you are there for surgery. A staff member will come escort you to the surgical area on the second floor.     If you have any questions or concerns, please do not hesitate to call:     (Prior to the day of surgery) [facility] department:  [phone]   (Day of surgery) Pre-Op department:  [phone]    These surgical instructions were reviewed with Wilfredo Duane during the [facility] phone call.

## 2022-12-07 ENCOUNTER — ANESTHESIA EVENT (OUTPATIENT)
Dept: ENDOSCOPY | Age: 32
End: 2022-12-07
Payer: COMMERCIAL

## 2022-12-08 ENCOUNTER — HOSPITAL ENCOUNTER (OUTPATIENT)
Age: 32
Setting detail: OUTPATIENT SURGERY
Discharge: HOME | End: 2022-12-08
Attending: INTERNAL MEDICINE | Admitting: INTERNAL MEDICINE
Payer: COMMERCIAL

## 2022-12-08 ENCOUNTER — ANESTHESIA (OUTPATIENT)
Dept: ENDOSCOPY | Age: 32
End: 2022-12-08
Payer: COMMERCIAL

## 2022-12-08 VITALS
OXYGEN SATURATION: 100 % | TEMPERATURE: 97.1 F | WEIGHT: 267 LBS | SYSTOLIC BLOOD PRESSURE: 101 MMHG | HEART RATE: 55 BPM | DIASTOLIC BLOOD PRESSURE: 64 MMHG | HEIGHT: 65 IN | RESPIRATION RATE: 18 BRPM | BODY MASS INDEX: 44.48 KG/M2

## 2022-12-08 LAB
AMPHET UR QL SCN: NEGATIVE
BARBITURATES UR QL SCN: NEGATIVE
BENZODIAZ UR QL: NEGATIVE
CANNABINOIDS UR QL SCN: POSITIVE
COCAINE UR QL SCN: NEGATIVE
HCG UR QL: NEGATIVE
HCG UR QL: NEGATIVE
HDSCOM,HDSCOM: ABNORMAL
METHADONE UR QL: NEGATIVE
OPIATES UR QL: NEGATIVE
PCP UR QL: NEGATIVE

## 2022-12-08 PROCEDURE — 76040000019: Performed by: INTERNAL MEDICINE

## 2022-12-08 PROCEDURE — 77030008565 HC TBNG SUC IRR ERBE -B: Performed by: INTERNAL MEDICINE

## 2022-12-08 PROCEDURE — 77030019988 HC FCPS ENDOSC DISP BSC -B: Performed by: INTERNAL MEDICINE

## 2022-12-08 PROCEDURE — 76060000031 HC ANESTHESIA FIRST 0.5 HR: Performed by: INTERNAL MEDICINE

## 2022-12-08 PROCEDURE — 74011250636 HC RX REV CODE- 250/636: Performed by: NURSE ANESTHETIST, CERTIFIED REGISTERED

## 2022-12-08 PROCEDURE — 88305 TISSUE EXAM BY PATHOLOGIST: CPT

## 2022-12-08 PROCEDURE — 81025 URINE PREGNANCY TEST: CPT

## 2022-12-08 PROCEDURE — 74011000250 HC RX REV CODE- 250: Performed by: NURSE ANESTHETIST, CERTIFIED REGISTERED

## 2022-12-08 PROCEDURE — 77030021593 HC FCPS BIOP ENDOSC BSC -A: Performed by: INTERNAL MEDICINE

## 2022-12-08 PROCEDURE — 80307 DRUG TEST PRSMV CHEM ANLYZR: CPT

## 2022-12-08 PROCEDURE — 2709999900 HC NON-CHARGEABLE SUPPLY: Performed by: INTERNAL MEDICINE

## 2022-12-08 PROCEDURE — 77030013992 HC SNR POLYP ENDOSC BSC -B: Performed by: INTERNAL MEDICINE

## 2022-12-08 RX ORDER — LIDOCAINE HYDROCHLORIDE 10 MG/ML
0.1 INJECTION, SOLUTION EPIDURAL; INFILTRATION; INTRACAUDAL; PERINEURAL AS NEEDED
Status: DISCONTINUED | OUTPATIENT
Start: 2022-12-08 | End: 2022-12-08 | Stop reason: HOSPADM

## 2022-12-08 RX ORDER — PROPOFOL 10 MG/ML
INJECTION, EMULSION INTRAVENOUS AS NEEDED
Status: DISCONTINUED | OUTPATIENT
Start: 2022-12-08 | End: 2022-12-08 | Stop reason: HOSPADM

## 2022-12-08 RX ORDER — SODIUM CHLORIDE, SODIUM LACTATE, POTASSIUM CHLORIDE, CALCIUM CHLORIDE 600; 310; 30; 20 MG/100ML; MG/100ML; MG/100ML; MG/100ML
50 INJECTION, SOLUTION INTRAVENOUS CONTINUOUS
Status: DISCONTINUED | OUTPATIENT
Start: 2022-12-08 | End: 2022-12-08 | Stop reason: HOSPADM

## 2022-12-08 RX ORDER — LIDOCAINE HYDROCHLORIDE 20 MG/ML
INJECTION, SOLUTION EPIDURAL; INFILTRATION; INTRACAUDAL; PERINEURAL AS NEEDED
Status: DISCONTINUED | OUTPATIENT
Start: 2022-12-08 | End: 2022-12-08 | Stop reason: HOSPADM

## 2022-12-08 RX ADMIN — SODIUM CHLORIDE, SODIUM LACTATE, POTASSIUM CHLORIDE, AND CALCIUM CHLORIDE 50 ML/HR: 600; 310; 30; 20 INJECTION, SOLUTION INTRAVENOUS at 11:39

## 2022-12-08 RX ADMIN — PROPOFOL 30 MG: 10 INJECTION, EMULSION INTRAVENOUS at 12:31

## 2022-12-08 RX ADMIN — PROPOFOL 30 MG: 10 INJECTION, EMULSION INTRAVENOUS at 12:28

## 2022-12-08 RX ADMIN — LIDOCAINE HYDROCHLORIDE 80 MG: 20 INJECTION, SOLUTION EPIDURAL; INFILTRATION; INTRACAUDAL; PERINEURAL at 12:22

## 2022-12-08 RX ADMIN — PROPOFOL 30 MG: 10 INJECTION, EMULSION INTRAVENOUS at 12:35

## 2022-12-08 RX ADMIN — PROPOFOL 30 MG: 10 INJECTION, EMULSION INTRAVENOUS at 12:27

## 2022-12-08 RX ADMIN — PROPOFOL 30 MG: 10 INJECTION, EMULSION INTRAVENOUS at 12:33

## 2022-12-08 RX ADMIN — PROPOFOL 20 MG: 10 INJECTION, EMULSION INTRAVENOUS at 12:37

## 2022-12-08 RX ADMIN — FAMOTIDINE 20 MG: 10 INJECTION, SOLUTION INTRAVENOUS at 11:40

## 2022-12-08 RX ADMIN — PROPOFOL 50 MG: 10 INJECTION, EMULSION INTRAVENOUS at 12:26

## 2022-12-08 RX ADMIN — PROPOFOL 50 MG: 10 INJECTION, EMULSION INTRAVENOUS at 12:23

## 2022-12-08 RX ADMIN — PROPOFOL 20 MG: 10 INJECTION, EMULSION INTRAVENOUS at 12:29

## 2022-12-08 RX ADMIN — PROPOFOL 100 MG: 10 INJECTION, EMULSION INTRAVENOUS at 12:21

## 2022-12-08 NOTE — ANESTHESIA PREPROCEDURE EVALUATION
Relevant Problems   No relevant active problems       Anesthetic History     PONV          Review of Systems / Medical History  Patient summary reviewed and pertinent labs reviewed    Pulmonary                   Neuro/Psych         Psychiatric history     Cardiovascular                       GI/Hepatic/Renal     GERD           Endo/Other        Morbid obesity     Other Findings              Physical Exam    Airway  Mallampati: II  TM Distance: 4 - 6 cm  Neck ROM: normal range of motion   Mouth opening: Diminished (comment)     Cardiovascular    Rhythm: regular  Rate: normal         Dental    Dentition: Poor dentition     Pulmonary  Breath sounds clear to auscultation               Abdominal  GI exam deferred       Other Findings            Anesthetic Plan    ASA: 3  Anesthesia type: MAC            Anesthetic plan and risks discussed with: Patient

## 2022-12-08 NOTE — H&P
[address]  [facility], [address]        Impression:   1.dyspahgia reflxu   2. Chronic diarrhea       Plan:     1. Egd colo mac all risks benefits and alt discussed       Chief Complaint: dyspahgia reflux chronic diarrhea       HPI:  Mac Caraballo is a 34 y.o. female who is being seen on consult for dysphagia reflux diarrhea . PMH:   Past Medical History:   Diagnosis Date    Depression     GERD (gastroesophageal reflux disease)     Nausea & vomiting     Obesity     Rectal bleeding        PSH:   History reviewed. No pertinent surgical history. Social HX:   Social History     Socioeconomic History    Marital status:      Spouse name: Not on file    Number of children: Not on file    Years of education: Not on file    Highest education level: Not on file   Occupational History    Not on file   Tobacco Use    Smoking status: Never    Smokeless tobacco: Never   Substance and Sexual Activity    Alcohol use: Never    Drug use: Not Currently     Types: Marijuana    Sexual activity: Not on file   Other Topics Concern    Not on file   Social History Narrative    Not on file     Social Determinants of Health     Financial Resource Strain: Not on file   Food Insecurity: Not on file   Transportation Needs: Not on file   Physical Activity: Not on file   Stress: Not on file   Social Connections: Not on file   Intimate Partner Violence: Not on file   Housing Stability: Not on file       FHX:   History reviewed. No pertinent family history. Allergy:   Allergies   Allergen Reactions    Latex, Natural Rubber Hives    Other Food Anaphylaxis and Angioedema     Pt reports allergy to cantalope       Home Medications:     Medications Prior to Admission   Medication Sig    pantoprazole sodium (PANTOPRAZOLE PO) Take 40 mg by mouth two (2) times a day. famotidine (PEPCID) 40 mg tablet Take 40 mg by mouth daily.     lamoTRIgine (LaMICtal) 150 mg tablet Take  by mouth daily. topiramate (TOPAMAX) 25 mg tablet Take  by mouth daily. escitalopram oxalate (LEXAPRO) 20 mg tablet Take 20 mg by mouth daily. Cetirizine (ZyrTEC) 10 mg cap Take  by mouth daily. montelukast (SINGULAIR) 10 mg tablet Take 10 mg by mouth daily. trazodone HCl (TRAZODONE PO) Take 75 mg by mouth nightly. HYDROcodone-acetaminophen (NORCO) 5-325 mg per tablet Take 1-2 tablets PO every 4-6 hours as needed for pain control. If over the counter ibuprofen or acetaminophen was suggested, then only take the vicodin for pain not well controlled with the over the counter medication. (Patient not taking: Reported on 12/1/2022)       Review of Systems:     Constitutional: No fevers, chills, weight loss, fatigue. Skin: No rashes, pruritis, jaundice, ulcerations, erythema. HENT: No headaches, nosebleeds, sinus pressure, rhinorrhea, sore throat. Eyes: No visual changes, blurred vision, eye pain, photophobia, jaundice. Cardiovascular: No chest pain, heart palpitations. Respiratory: No cough, SOB, wheezing, chest discomfort, orthopnea. Gastrointestinal: Reflux diarrhea dysphagia    Genitourinary: No dysuria, bleeding, discharge, pyuria. Musculoskeletal: No weakness, arthralgias, wasting. Endo: No sweats. Heme: No bruising, easy bleeding. Allergies: As noted. Neurological: Cranial nerves intact. Alert and oriented. Gait not assessed. Psychiatric:  No anxiety, depression, hallucinations. Visit Vitals  Ht 5' 5\" (1.651 m)   Wt 120.2 kg (265 lb)   LMP  (LMP Unknown) Comment: aproximately 10 yrs per patient she has a IUD   BMI 44.10 kg/m²       Physical Assessment:     constitutional: appearance: well developed, well nourished, normal habitus, no deformities, in no acute distress. skin: inspection: no rashes, ulcers, icterus or other lesions; no clubbing or telangiectasias. palpation: no induration or subcutaneos nodules.    eyes: inspection: normal conjunctivae and lids; no jaundice pupils: symmetrical, normoreactive to light, normal accommodation and size. ENMT: mouth: normal oral mucosa,lips and gums; good dentition. oropharynx: normal tongue, hard and soft palate; posterior pharynx without erythema, exudate or lesions. neck: no masses organomegaly or tenderness. respiratory: effort: normal chest excursion; no intercostal retraction or accessory muscle use. cardiovascular: abdominal aorta: normal size and position; no bruits. palpation: PMI of normal size and position; normal rhythm; no thrill or murmurs. abdominal: abdomen: normal consistency; no tenderness or masses. hernias: no hernias appreciated. liver: normal size and consistency. spleen: not palpable. rectal: hemoccult/guaiac: not performed. musculoskeletal: no deformities or muscle wasting   lymphatic: axilae: not palpable. groin: not palpable. neck: within normal limits. other: not palpable. neurologic: cranial nerves: II-XII normal.   psychiatric: judgement/insight: within normal limits. memory: within normal limits for recent and remote events. mood and affect: no evidence of depression, anxiety or agitation. orientation: oriented to time, space and person. Basic Metabolic Profile   No results for input(s): NA, K, CL, CO2, BUN, GLU, CA, MG, PHOS in the last 72 hours. No lab exists for component: CREAT      CBC w/Diff    No results for input(s): WBC, RBC, HGB, HCT, MCV, MCH, MCHC, RDW, PLT, HGBEXT, HCTEXT, PLTEXT in the last 72 hours. No lab exists for component: MPV No results for input(s): GRANS, LYMPH, EOS, PRO, MYELO, METAS, BLAST in the last 72 hours. No lab exists for component: MONO, BASO     Hepatic Function   No results for input(s): ALB, TP, TBILI, AP, AML, LPSE in the last 72 hours. No lab exists for component: DBILI, GPT, SGOT       Yumiko Hardy MD, M.D.    [facility] [address]dom[address], [facility]  [phone]  [email or web address] White [email or web address]

## 2022-12-08 NOTE — ANESTHESIA POSTPROCEDURE EVALUATION
Procedure(s):  UPPER ENDOSCOPY w/ bxs W/ DILATION  COLONOSCOPY w/ bxs, w/ polypectomy. MAC    Anesthesia Post Evaluation      Multimodal analgesia: multimodal analgesia used between 6 hours prior to anesthesia start to PACU discharge  Patient location during evaluation: bedside  Patient participation: complete - patient participated  Level of consciousness: awake  Pain management: adequate  Airway patency: patent  Anesthetic complications: no  Cardiovascular status: stable  Respiratory status: acceptable  Hydration status: acceptable  Post anesthesia nausea and vomiting:  controlled      INITIAL Post-op Vital signs:   Vitals Value Taken Time   /65 12/08/22 1334   Temp 36.3 °C (97.3 °F) 12/08/22 1244   Pulse 59 12/08/22 1343   Resp 13 12/08/22 1343   SpO2 99 % 12/08/22 1343   Vitals shown include unvalidated device data.

## 2022-12-08 NOTE — DISCHARGE INSTRUCTIONS
Upper GI Endoscopy: What to Expect at [address] had an upper GI endoscopy. Your doctor used a thin, lighted tube that bends to look at the inside of your esophagus, your stomach, and the first part of the small intestine, called the duodenum. After you have an endoscopy, you will stay at the hospital or clinic for 1 to 2 hours. This will allow the medicine to wear off. You will be able to go home after your doctor or nurse checks to make sure that you're not having any problems. You may have to stay overnight if you had treatment during the test. You may have a sore throat for a day or two after the test.  This care sheet gives you a general idea about what to expect after the test.  How can you care for yourself at home? Activity   Rest as much as you need to after you go home. You should be able to go back to your usual activities the day after the test.  Diet   Follow your doctor's directions for eating after the test.  Drink plenty of fluids (unless your doctor has told you not to). Medications   If you have a sore throat the day after the test, use an over-the-counter spray to numb your throat. Follow-up care is a key part of your treatment and safety. Be sure to make and go to all appointments, and call your doctor if you are having problems. It's also a good idea to know your test results and keep a list of the medicines you take. When should you call for help? Call 911 anytime you think you may need emergency care. For example, call if:    You passed out (lost consciousness). You have trouble breathing. You pass maroon or bloody stools. Call your doctor now or seek immediate medical care if:    You have pain that does not get better after your take pain medicine. You have new or worse belly pain. You have blood in your stools. You are sick to your stomach and cannot keep fluids down. You have a fever. You cannot pass stools or gas.    Watch closely for changes in your health, and be sure to contact your doctor if:    Your throat still hurts after a day or two. You do not get better as expected. Where can you learn more? Go to http://www.machado.com/  Enter J454 in the search box to learn more about \"Upper GI Endoscopy: What to Expect at Home. \"  Current as of: June 6, 2022               Content Version: 13.4  © 2006-2022 [employer]. Care instructions adapted under license by [employer] (which disclaims liability or warranty for this information). If you have questions about a medical condition or this instruction, always ask your healthcare professional. [place] [address] any warranty or liability for your use of this information. Colonoscopy: What to Expect at [address] [place] [address]  After a colonoscopy, you'll stay at the clinic until you wake up. Then you can go home. But you'll need to arrange for a ride. Your doctor will tell you when you can eat and do your other usual activities. Your doctor will talk to you about when you'll need your next colonoscopy. Your doctor can help you decide how often you need to be checked. This will depend on the results of your test and your risk for colorectal cancer. After the test, you may be bloated or have gas pains. You may need to pass gas. If a biopsy was done or a polyp was removed, you may have streaks of blood in your stool (feces) for a few days. Problems such as heavy rectal bleeding may not occur until several weeks after the test. This isn't common. But it can happen after polyps are removed. This care sheet gives you a general idea about how long it will take for you to recover. But each person recovers at a different pace. Follow the steps below to get better as quickly as possible. How can you care for yourself at home? Activity    Rest when you feel tired. You can do your normal activities when it feels okay to do so. Diet    Follow your doctor's directions for eating. Unless your doctor has told you not to, drink plenty of fluids. This helps to replace the fluids that were lost during the colon prep. Do not drink alcohol. Medicines    Your doctor will tell you if and when you can restart your medicines. You will also be given instructions about taking any new medicines. If you stopped taking aspirin or some other blood thinner, your doctor will tell you when to start taking it again. If polyps were removed or a biopsy was done during the test, your doctor may tell you not to take aspirin or other anti-inflammatory medicines for a few days. These include ibuprofen (Advil, Motrin) and naproxen (Aleve). Other instructions    For your safety, do not drive or operate machinery until the medicine wears off and you can think clearly. Your doctor may tell you not to drive or operate machinery until the day after your test.     Do not sign legal documents or make major decisions until the medicine wears off and you can think clearly. The anesthesia can make it hard for you to fully understand what you are agreeing to. Follow-up care is a key part of your treatment and safety. Be sure to make and go to all appointments, and call your doctor if you are having problems. It's also a good idea to know your test results and keep a list of the medicines you take. When should you call for help? Call 911 anytime you think you may need emergency care. For example, call if:    You passed out (lost consciousness). You pass maroon or bloody stools. You have trouble breathing. Call your doctor now or seek immediate medical care if:    You have pain that does not get better after you take pain medicine. You are sick to your stomach or cannot drink fluids. You have new or worse belly pain. You have blood in your stools. You have a fever. You cannot pass stools or gas.    Watch closely for changes in your health, and be sure to contact your doctor if you have any problems. Where can you learn more? Go to http://www.gray.com/  Enter E264 in the search box to learn more about \"Colonoscopy: What to Expect at Home. \"  Current as of: May 4, 2022               Content Version: 13.4  © 2006-2022 [employer]. Care instructions adapted under license by [employer] (which disclaims liability or warranty for this information). If you have questions about a medical condition or this instruction, always ask your healthcare professional. [address] any warranty or liability for your use of this information. Learning About Gastric Polyps  What are gastric polyps? Gastric polyps are growths in the stomach. Most people who get them don't have any problems. The cause of most gastric polyps is not known. But some polyps grow in people who use stomach acid reducers called proton pump inhibitors (PPIs). People who have gastritis, ulcers, or an H. pylori infection in the stomach can sometimes get polyps. People who have a parent, brother, or sister with gastric polyps might have them too. Most gastric polyps aren't cancer. But a certain kind of polyp can turn into cancer. What are the symptoms? You may not know that you have gastric polyps. Most polyps don't lead to symptoms. Once in a while, larger polyps may cause bleeding, belly pain, or a blockage in the stomach. How are polyps diagnosed and treated? Most gastric polyps are found during an endoscopy (say \"[id number]-pee\") that is done for another health problem. Endoscopy is a test that uses a thin flexible tube to allow a doctor to look at the inside of your stomach. Your doctor will treat your polyps based on what he or she sees during this test. If your doctor finds a polyp during the test, he or she may take it out. It will then be tested to make sure it isn't cancer.   If your doctor finds H. pylori bacteria in your stomach, he or she will prescribe antibiotics. If you have been taking a PPI, the doctor may prescribe a different medicine instead. Sometimes the doctor may suggest another endoscopy to see how treatment is working. He or she may also suggest this to recheck certain kinds of polyps. The doctor may also suggest a colonoscopy to look for polyps in your colon. Follow-up care is a key part of your treatment and safety. Be sure to make and go to all appointments, and call your doctor if you are having problems. It's also a good idea to know your test results and keep a list of the medicines you take. Current as of: June 6, 2022               Content Version: 13.4  © 2006-2022 [employer]. Care instructions adapted under license by [employer] (which disclaims liability or warranty for this information). If you have questions about a medical condition or this instruction, always ask your healthcare professional. [place] [address] any warranty or liability for your use of this information. Colon Polyps: Care Instructions  Your Care Instructions     Colon polyps are growths in the colon or the rectum. The cause of most colon polyps is not known, and most people who get them do not have any problems. But a certain kind can turn into cancer. For this reason, regular testing for colon polyps is important for people as they get older. It is also important for anyone who has an increased risk for colon cancer. Polyps are usually found through routine colon cancer screening tests. Although most colon polyps are not cancerous, they are usually removed and then tested for cancer. Screening for colon cancer saves lives because the cancer can usually be cured if it is caught early. If you have a polyp that is the type that can turn into cancer, you may need more tests to examine your entire colon.  The doctor will remove any other polyps that are found, and you will be tested more often. Follow-up care is a key part of your treatment and safety. Be sure to make and go to all appointments, and call your doctor if you are having problems. It's also a good idea to know your test results and keep a list of the medicines you take. How can you care for yourself at home? Regular exams to look for colon polyps are the best way to prevent polyps from turning into colon cancer. These can include stool tests, sigmoidoscopy, colonoscopy, and CT colonography. Talk with your doctor about a testing schedule that is right for you. To prevent polyps  There is no home treatment that can prevent colon polyps. But these steps may help lower your risk for cancer. Stay active. Being active can help you get to and stay at a healthy weight. Try to exercise on most days of the week. Walking is a good choice. Eat well. Choose a variety of vegetables, fruits, legumes (such as peas and beans), fish, poultry, and whole grains. Do not smoke. If you need help quitting, talk to your doctor about stop-smoking programs and medicines. These can increase your chances of quitting for good. If you drink alcohol, limit how much you drink. Limit alcohol to 2 drinks a day for men and 1 drink a day for women. When should you call for help? Call your doctor now or seek immediate medical care if:    You have severe belly pain. Your stools are maroon or very bloody. Watch closely for changes in your health, and be sure to contact your doctor if:    You have a fever. You have nausea or vomiting. You have a change in bowel habits (new constipation or diarrhea). Your symptoms get worse or are not improving as expected. Where can you learn more? Go to http://www.EnergyC[employer].com/  Enter C571 in the search box to learn more about \"Colon Polyps: Care Instructions. \"  Current as of: June 6, 2022               Content Version: 13.4  © 6599-7286 [employer]. Care instructions adapted under license by [employer] (which disclaims liability or warranty for this information). If you have questions about a medical condition or this instruction, always ask your healthcare professional. [address] any warranty or liability for your use of this information. DISCHARGE SUMMARY from Nurse    PATIENT INSTRUCTIONS:    After general anesthesia or intravenous sedation, for 24 hours or while taking prescription Narcotics:  Limit your activities  Do not drive and operate hazardous machinery  Do not make important personal or business decisions  Do  not drink alcoholic beverages  If you have not urinated within 8 hours after discharge, please contact your surgeon on call. Report the following to your surgeon:  Excessive pain, swelling, redness or odor of or around the surgical area  Temperature over 100.5  Nausea and vomiting lasting longer than 4 hours or if unable to take medications  Any signs of decreased circulation or nerve impairment to extremity: change in color, persistent  numbness, tingling, coldness or increase pain  Any questions        These are general instructions for a healthy lifestyle:    No smoking/ No tobacco products/ Avoid exposure to second hand smoke  Surgeon General's Warning:  Quitting smoking now greatly reduces serious risk to your health. Obesity, smoking, and sedentary lifestyle greatly increases your risk for illness    A healthy diet, regular physical exercise & weight monitoring are important for maintaining a healthy lifestyle    You may be retaining fluid if you have a history of heart failure or if you experience any of the following symptoms:  Weight gain of 3 pounds or more overnight or 5 pounds in a week, increased swelling in our hands or feet or shortness of breath while lying flat in bed.   Please call your doctor as soon as you notice any of these symptoms; do not wait until your next office visit. The discharge information has been reviewed with the patient. The patient verbalized understanding. Discharge medications reviewed with the patient and appropriate educational materials and side effects teaching were provided.   ___________________________________________________________________________________________________________________________________

## (undated) DEVICE — BITE BLOCK ENDOSCP UNIV AD 6 TO 9.4 MM

## (undated) DEVICE — FCPS RAD JAW 4LC 240CM W/NDL -- BX/40

## (undated) DEVICE — SYR 50ML SLIP TIP NSAF LF STRL --

## (undated) DEVICE — FLUFF AND POLYMER UNDERPAD,EXTRA HEAVY: Brand: WINGS

## (undated) DEVICE — MEDI-VAC NON-CONDUCTIVE SUCTION TUBING: Brand: CARDINAL HEALTH

## (undated) DEVICE — BASIN EMSIS 16OZ GRAPHITE PLAS KID SHP MOLD GRAD FOR ORAL

## (undated) DEVICE — FORCEPS BX L240CM JAW DIA2.4MM ORNG L CAP W/ NDL DISP RAD

## (undated) DEVICE — GOWN ISOL IMPERV UNIV, DISP, OPEN BACK, BLUE --

## (undated) DEVICE — SYRINGE MED 25GA 3ML L5/8IN SUBQ PLAS W/ DETACH NDL SFTY

## (undated) DEVICE — STERILE POLYISOPRENE POWDER-FREE SURGICAL GLOVES: Brand: PROTEXIS

## (undated) DEVICE — GAUZE,SPONGE,4"X4",16PLY,STRL,LF,10/TRAY: Brand: MEDLINE

## (undated) DEVICE — SYR 20ML LL STRL LF --

## (undated) DEVICE — CANNULA ORIG TL CLR W FOAM CUSHIONS AND 14FT SUPL TB 3 CHN

## (undated) DEVICE — YANKAUER,SMOOTH HANDLE,HIGH CAPACITY: Brand: MEDLINE INDUSTRIES, INC.

## (undated) DEVICE — SNARE POLYP M W27MMXL240CM OVL STIFF DISP CAPTIVATOR

## (undated) DEVICE — LINER SUCT CANSTR 3000CC PLAS SFT PRE ASSEMB W/OUT TBNG W/

## (undated) DEVICE — ENDOSCOPY PUMP TUBING/ CAP SET: Brand: ERBE

## (undated) DEVICE — SOLUTION IRRIG 1000ML H2O STRL BLT

## (undated) DEVICE — SYR 10ML LUER LOK 1/5ML GRAD --

## (undated) DEVICE — CANNULA NSL AD TBNG L14FT STD PVC O2 CRV CONN NONFLARED NSL

## (undated) DEVICE — CATHETER SUCT TR FL TIP 14FR W/ O CTRL